# Patient Record
Sex: FEMALE | Race: WHITE
[De-identification: names, ages, dates, MRNs, and addresses within clinical notes are randomized per-mention and may not be internally consistent; named-entity substitution may affect disease eponyms.]

---

## 2022-08-10 ENCOUNTER — HOSPITAL ENCOUNTER (INPATIENT)
Dept: HOSPITAL 26 - MED | Age: 82
LOS: 7 days | Discharge: NURSING, REHAB OR LONG-TERM CARE | DRG: 637 | End: 2022-08-17
Attending: STUDENT IN AN ORGANIZED HEALTH CARE EDUCATION/TRAINING PROGRAM | Admitting: STUDENT IN AN ORGANIZED HEALTH CARE EDUCATION/TRAINING PROGRAM
Payer: COMMERCIAL

## 2022-08-10 VITALS — SYSTOLIC BLOOD PRESSURE: 120 MMHG | DIASTOLIC BLOOD PRESSURE: 68 MMHG

## 2022-08-10 VITALS — WEIGHT: 180 LBS | BODY MASS INDEX: 28.25 KG/M2 | HEIGHT: 67 IN

## 2022-08-10 VITALS — SYSTOLIC BLOOD PRESSURE: 169 MMHG | DIASTOLIC BLOOD PRESSURE: 69 MMHG

## 2022-08-10 DIAGNOSIS — Z88.1: ICD-10-CM

## 2022-08-10 DIAGNOSIS — E11.01: Primary | ICD-10-CM

## 2022-08-10 DIAGNOSIS — E78.5: ICD-10-CM

## 2022-08-10 DIAGNOSIS — E87.6: ICD-10-CM

## 2022-08-10 DIAGNOSIS — I10: ICD-10-CM

## 2022-08-10 DIAGNOSIS — E83.42: ICD-10-CM

## 2022-08-10 DIAGNOSIS — M19.90: ICD-10-CM

## 2022-08-10 DIAGNOSIS — G93.41: ICD-10-CM

## 2022-08-10 DIAGNOSIS — N39.0: ICD-10-CM

## 2022-08-10 DIAGNOSIS — Z79.899: ICD-10-CM

## 2022-08-10 DIAGNOSIS — Z87.442: ICD-10-CM

## 2022-08-10 DIAGNOSIS — L89.159: ICD-10-CM

## 2022-08-10 DIAGNOSIS — Z20.822: ICD-10-CM

## 2022-08-10 DIAGNOSIS — Z88.8: ICD-10-CM

## 2022-08-10 DIAGNOSIS — Z88.5: ICD-10-CM

## 2022-08-10 LAB
ALBUMIN FLD-MCNC: 3.2 G/DL (ref 3.4–5)
ANION GAP SERPL CALCULATED.3IONS-SCNC: 15.6 MMOL/L (ref 8–16)
APPEARANCE UR: (no result)
AST SERPL-CCNC: 10 U/L (ref 15–37)
BASOPHILS # BLD AUTO: 0.1 K/UL (ref 0–0.22)
BASOPHILS NFR BLD AUTO: 1.1 % (ref 0–2)
BILIRUB SERPL-MCNC: 0.3 MG/DL (ref 0–1)
BILIRUB UR QL STRIP: (no result)
BUN SERPL-MCNC: 18 MG/DL (ref 7–18)
CHLORIDE SERPL-SCNC: 100 MMOL/L (ref 98–107)
CO2 SERPL-SCNC: 25.3 MMOL/L (ref 21–32)
COLOR UR: YELLOW
CREAT SERPL-MCNC: 1.1 MG/DL (ref 0.6–1.3)
EOSINOPHIL # BLD AUTO: 0 K/UL (ref 0–0.4)
EOSINOPHIL NFR BLD AUTO: 0.6 % (ref 0–4)
ERYTHROCYTE [DISTWIDTH] IN BLOOD BY AUTOMATED COUNT: 14.5 % (ref 11.6–13.7)
GFR SERPL CREATININE-BSD FRML MDRD: (no result) ML/MIN (ref 90–?)
GLUCOSE SERPL-MCNC: 450 MG/DL (ref 74–106)
GLUCOSE UR STRIP-MCNC: (no result) MG/DL
HCT VFR BLD AUTO: 38.6 % (ref 36–48)
HGB BLD-MCNC: 12.7 G/DL (ref 12–16)
HGB UR QL STRIP: (no result)
LEUKOCYTE ESTERASE UR QL STRIP: (no result)
LYMPHOCYTES # BLD AUTO: 1.3 K/UL (ref 2.5–16.5)
LYMPHOCYTES NFR BLD AUTO: 18.1 % (ref 20.5–51.1)
MCH RBC QN AUTO: 28 PG (ref 27–31)
MCHC RBC AUTO-ENTMCNC: 33 G/DL (ref 33–37)
MCV RBC AUTO: 84.4 FL (ref 80–94)
MONOCYTES # BLD AUTO: 0.4 K/UL (ref 0.8–1)
MONOCYTES NFR BLD AUTO: 5.9 % (ref 1.7–9.3)
NEUTROPHILS # BLD AUTO: 5.2 K/UL (ref 1.8–7.7)
NEUTROPHILS NFR BLD AUTO: 74.3 % (ref 42.2–75.2)
NITRITE UR QL STRIP: NEGATIVE
PH UR STRIP: 5.5 [PH] (ref 5–9)
PLATELET # BLD AUTO: 325 K/UL (ref 140–450)
POTASSIUM SERPL-SCNC: 3.9 MMOL/L (ref 3.5–5.1)
RBC # BLD AUTO: 4.58 MIL/UL (ref 4.2–5.4)
RBC #/AREA URNS HPF: (no result) /HPF (ref 0–5)
SODIUM SERPL-SCNC: 137 MMOL/L (ref 136–145)
URATE CRY URNS QL MICRO: (no result) /HPF
WBC # BLD AUTO: 7 K/UL (ref 4.8–10.8)
WBC,URINE: (no result) /HPF (ref 0–5)

## 2022-08-10 PROCEDURE — A6248 HYDROGEL DRSG GEL FILLER: HCPCS

## 2022-08-10 RX ADMIN — Medication SCH DEV: at 21:00

## 2022-08-10 RX ADMIN — INSULIN LISPRO PRN UNITS: 100 INJECTION, SOLUTION INTRAVENOUS; SUBCUTANEOUS at 22:07

## 2022-08-10 RX ADMIN — SODIUM CHLORIDE SCH MLS/HR: 9 INJECTION, SOLUTION INTRAVENOUS at 20:11

## 2022-08-10 NOTE — NUR
Patient will be admitted to care of DR. KENDALL. Admited to TELEMTETRY.  Will 
go to room 105A. Belongings list completed.  Report to YADI.

## 2022-08-10 NOTE — NUR
81YR OLD FEMALE BIB EMS C/O ALOC /WEAKNESS. PT IS FROM [facility].  EMS 
SENT OUT FOR PT BEING MORE ALOC THAN NORMAL.  PT IS A&OX1.  BED BOUND .  SKIN 
INTACT WARM AND DRY.  HOB ELEVATED AND SIDE RAILS UP .  BED AT LOWEST LEVEL.

## 2022-08-10 NOTE — NUR
STARTED IV FLUIDS AT 75 ML/HR WITH PUMP. LIGHTS DIMMED PER PT REQUEST. ASKED PT 
QUESTIONS AND SHE COULD NOT RECLALL AND SAID "WHY ARE YOU ASKING ME ALL THESE 
QUESTIONS" . PT A&O X0

## 2022-08-10 NOTE — NUR
RECEIVED REPORT FROM ER NURSE. PATIENT IS AWAKE AND CONFUSED. RESPIRATION EVEN UNLABORED ON 
ROOM AIR. NO DISTRESS NOTED. SKIN IS WARM AND DRY. IV PATENT AND INTACT. LUNGS SOUNDS CLEAR 
UPON AUSCULTATION. BOWEL SOUNDS PRESENT IN ALL QUADRANTS. ABDOMEN SOFT AND NON-TENDER. 
SACRAL WOUND NOTED. PICTURE TAKEN.  GENERALIZED WEAKNESS NOTED. MRSA SWAB. VITALS WERE 
TAKEN. ORIENT PATIENT TO ROOM, STAFF AND CALL LIGHT. ALL SAFETY MEASURES IN PLACE. BED IS AT 
LOW POSITION. CALL LIGHT WITHIN REACH. WILL CONTINUE TO MONITOR.

## 2022-08-11 VITALS — SYSTOLIC BLOOD PRESSURE: 153 MMHG | DIASTOLIC BLOOD PRESSURE: 77 MMHG

## 2022-08-11 VITALS — SYSTOLIC BLOOD PRESSURE: 178 MMHG | DIASTOLIC BLOOD PRESSURE: 71 MMHG

## 2022-08-11 VITALS — DIASTOLIC BLOOD PRESSURE: 74 MMHG | SYSTOLIC BLOOD PRESSURE: 150 MMHG

## 2022-08-11 VITALS — SYSTOLIC BLOOD PRESSURE: 157 MMHG | DIASTOLIC BLOOD PRESSURE: 77 MMHG

## 2022-08-11 VITALS — DIASTOLIC BLOOD PRESSURE: 85 MMHG | SYSTOLIC BLOOD PRESSURE: 143 MMHG

## 2022-08-11 VITALS — SYSTOLIC BLOOD PRESSURE: 151 MMHG | DIASTOLIC BLOOD PRESSURE: 62 MMHG

## 2022-08-11 LAB
ANION GAP SERPL CALCULATED.3IONS-SCNC: 14.3 MMOL/L (ref 8–16)
BASOPHILS # BLD AUTO: 0.1 K/UL (ref 0–0.22)
BASOPHILS NFR BLD AUTO: 0.9 % (ref 0–2)
BUN SERPL-MCNC: 19 MG/DL (ref 7–18)
CHLORIDE SERPL-SCNC: 106 MMOL/L (ref 98–107)
CO2 SERPL-SCNC: 26 MMOL/L (ref 21–32)
CREAT SERPL-MCNC: 1.1 MG/DL (ref 0.6–1.3)
EOSINOPHIL # BLD AUTO: 0.1 K/UL (ref 0–0.4)
EOSINOPHIL NFR BLD AUTO: 1.3 % (ref 0–4)
ERYTHROCYTE [DISTWIDTH] IN BLOOD BY AUTOMATED COUNT: 14.6 % (ref 11.6–13.7)
GFR SERPL CREATININE-BSD FRML MDRD: (no result) ML/MIN (ref 90–?)
GLUCOSE SERPL-MCNC: 261 MG/DL (ref 74–106)
HCT VFR BLD AUTO: 34 % (ref 36–48)
HGB BLD-MCNC: 11.3 G/DL (ref 12–16)
LYMPHOCYTES # BLD AUTO: 1.5 K/UL (ref 2.5–16.5)
LYMPHOCYTES NFR BLD AUTO: 24.2 % (ref 20.5–51.1)
MCH RBC QN AUTO: 28 PG (ref 27–31)
MCHC RBC AUTO-ENTMCNC: 33 G/DL (ref 33–37)
MCV RBC AUTO: 84.3 FL (ref 80–94)
MONOCYTES # BLD AUTO: 0.5 K/UL (ref 0.8–1)
MONOCYTES NFR BLD AUTO: 8.4 % (ref 1.7–9.3)
NEUTROPHILS # BLD AUTO: 4.2 K/UL (ref 1.8–7.7)
NEUTROPHILS NFR BLD AUTO: 65.2 % (ref 42.2–75.2)
PLATELET # BLD AUTO: 270 K/UL (ref 140–450)
POTASSIUM SERPL-SCNC: 3.3 MMOL/L (ref 3.5–5.1)
RBC # BLD AUTO: 4.03 MIL/UL (ref 4.2–5.4)
SODIUM SERPL-SCNC: 143 MMOL/L (ref 136–145)
WBC # BLD AUTO: 6.4 K/UL (ref 4.8–10.8)

## 2022-08-11 RX ADMIN — INSULIN LISPRO PRN UNITS: 100 INJECTION, SOLUTION INTRAVENOUS; SUBCUTANEOUS at 06:31

## 2022-08-11 RX ADMIN — Medication SCH DEV: at 16:49

## 2022-08-11 RX ADMIN — Medication SCH EA: at 12:23

## 2022-08-11 RX ADMIN — SODIUM CHLORIDE SCH MLS/HR: 9 INJECTION, SOLUTION INTRAVENOUS at 07:54

## 2022-08-11 RX ADMIN — INSULIN LISPRO PRN UNITS: 100 INJECTION, SOLUTION INTRAVENOUS; SUBCUTANEOUS at 12:24

## 2022-08-11 RX ADMIN — Medication SCH DEV: at 12:23

## 2022-08-11 RX ADMIN — INSULIN LISPRO PRN UNITS: 100 INJECTION, SOLUTION INTRAVENOUS; SUBCUTANEOUS at 20:46

## 2022-08-11 RX ADMIN — POTASSIUM CHLORIDE PRN MEQ: 750 TABLET, FILM COATED, EXTENDED RELEASE ORAL at 08:59

## 2022-08-11 RX ADMIN — Medication SCH DEV: at 06:30

## 2022-08-11 RX ADMIN — Medication SCH DEV: at 20:43

## 2022-08-11 RX ADMIN — INSULIN LISPRO PRN UNITS: 100 INJECTION, SOLUTION INTRAVENOUS; SUBCUTANEOUS at 16:55

## 2022-08-11 RX ADMIN — SODIUM CHLORIDE SCH MLS/HR: 9 INJECTION, SOLUTION INTRAVENOUS at 00:30

## 2022-08-11 RX ADMIN — MAGNESIUM SULFATE IN WATER PRN MLS/HR: 40 INJECTION, SOLUTION INTRAVENOUS at 09:32

## 2022-08-11 RX ADMIN — LEVOFLOXACIN SCH MLS/HR: 5 INJECTION, SOLUTION INTRAVENOUS at 20:23

## 2022-08-11 NOTE — NUR
RECEIVED REPORT FROM NIGHT SHIFT NURSE FOR CONTINUITY OF CARE. PT IS AWAKE. A&O1. 
RESPIRATIONS EVEN AND UNLABORED ON ROOM AIR. NO DISTRESS NOTED. PT ON TELE MONITOR. IV SITE 
AT RIGHT WRIST 20G INFUSING NS AT 75 ML/ HR. CALL LIGHT WITHIN REACH. SAFETY PRECAUTIONS IN 
PLACE. WILL CONTINUE TO MONITOR.

## 2022-08-11 NOTE — NUR
BLOOD GLUCOSE CHECK DONE. . SLIDING SCALE INSULIN ADMINISTERED.

-------------------------------------------------------------------------------

Addendum: 08/11/22 at 1714 by Jose Daniel Ulloa LVN

-------------------------------------------------------------------------------

ERROR

## 2022-08-11 NOTE — NUR
PATIENT HAS BEEN SCREENED AND CATEGORIZED AS MODERATE NUTRITION RISK. PATIENT WILL BE SEEN 
WITHIN 3-5 DAYS OF ADMISSION.



8/13/228/15/22



BIJAL MCALLISTER RD


-------------------------------------------------------------------------------

Addendum: 08/12/22 at 1133 by Bijal Mcallister RD

-------------------------------------------------------------------------------

FNS CONSULT HAS BEEN RECEIVED FOR WOUNDS. PATIENT HAS BEEN RE-SCREENED AS HIGH RISK AND WILL 
BE SEEN WITHIN 1-2 DAYS OF RECEIVING THE FNS CONSULT.



BIJAL MCALLISTER RD

## 2022-08-11 NOTE — NUR
ADMINISTERED SCHEDULED MORNING MEDS. KDUR GIVEN FOR POTASSIUM 3.3. IV MG SULF GIVEN BY TANGELA CARO. PT TEACHING ABOUT MEDS GIVEN. PT VERBALIZED UNDERSTANDING. WILL CONTINUE TO MONITOR.

## 2022-08-11 NOTE — NUR
DID ROUNDS. PT SITTING UPRIGHT IN BED, EATING DINNER. NO DISTRESS NOTED. CALL LIGHT WITHIN 
REACH. SAFETY PRECAUTIONS IN PLACE. WILL CONTINUE TO MONITOR.

## 2022-08-11 NOTE — NUR
ADMINISTERED SCHEDULED MORNING MEDS. PT TEACHING ABOUT MEDS GIVEN. PT VERBALIZED 
UNDERSTANDING. POTASSIUM IS 3.3. WILL INFORM RN. 

-------------------------------------------------------------------------------

Addendum: 08/11/22 at 0930 by Jose Daniel Ulloa LVN

-------------------------------------------------------------------------------

WRONG PT DOCUMENTED.

## 2022-08-11 NOTE — NUR
BLOOD GLUCOSE 237. PT REFUSED TO TAKE SLIDING SCALE INSULIN. ATTEMPTED 2X. ASKED HELP FROM 
TANGELA CARO. DISCUSSED WITH THE PT THE IMPORTANCE AND RISK OF NOT TAKING THE INSULIN. PT 
VERBALIZED UNDERSTANDING AND AGAIN REFUSED. WILL CONTINUE TO MONITOR.

## 2022-08-11 NOTE — NUR
DID ROUNDS. PT COMFORTABLY SITTING IN BED, WATCHING TV. NO DISTRESS NOTED. NO COMPLAINTS OF 
PAIN. CALL LIGHT WITHIN REACH. SAFETY PRECAUTIONS IN PLACE. WILL CONTINUE TO MONITOR.

## 2022-08-11 NOTE — NUR
DC PLANNIN YRS OLD FEMALE PATIENT WAS ADMITTED FROM [facility] WITH A DX OF ANTHONY. PATIENT HAS A 
HX OF DM, HTN, HLD, OSTEOARTHRITIS AND KIDNEY STONE AND UTI. PATIENT WAS RECENTLY DC TO 
[facility] AND SENT BACK TO [facility] ON . CXR SHOWED NO ACUTE 
CARDIOPULMONARY DISEASE. RAPID COVID TEST NEGATIVE. HEAD CT NO ACUTE INTRACRANIAL PROCESS. 
URINE CULTURE PENDING. ADMINISTERED IVF, IV ABX LEVAQUIN. PT EVAL PENDING. DC PLAN PER MD'S 
RECOMMENDATIONS. CM TO FOLLOW 

-------------------------------------------------------------------------------

Addendum: 22 at 1052 by Deirdre Fine RN

-------------------------------------------------------------------------------

DC PLANNING:

CALLED [facility] SPOKE WITH SUDHEER STATED THEY CAN NOT PROVIDE THE CARE SHE NEEDS ANY 
MORE , HER [facility]  IS LOOKING FOR PLACEMENT. CALLED [facility] SPOKE WITH 
MILENA LARES STATED  [facility] IS WORKING ON PLACEMENT AND WILL CONTACT US. CM TO 
FOLLOW 

-------------------------------------------------------------------------------

Addendum: 22 at 1057 by Deirdre Fine RN

-------------------------------------------------------------------------------

DC PLANNING:

PATIENT GOT ACCEPTED AT [facility] .RECEIVED AUTHORIZATION FROM MILENA AT [facility] FOR [facility] AND DL 
TRANSPORT [id number]. PATIENT CAN GO TO ROOM [facility]  # TO GIVE REPORT . DL TRANSPORT 
WILL  PATIENT AT 2 PM. NOTIFIED MING GARCIA CM TO FOLLOW

## 2022-08-12 VITALS — DIASTOLIC BLOOD PRESSURE: 86 MMHG | SYSTOLIC BLOOD PRESSURE: 197 MMHG

## 2022-08-12 VITALS — SYSTOLIC BLOOD PRESSURE: 166 MMHG | DIASTOLIC BLOOD PRESSURE: 73 MMHG

## 2022-08-12 VITALS — SYSTOLIC BLOOD PRESSURE: 179 MMHG | DIASTOLIC BLOOD PRESSURE: 91 MMHG

## 2022-08-12 VITALS — DIASTOLIC BLOOD PRESSURE: 74 MMHG | SYSTOLIC BLOOD PRESSURE: 156 MMHG

## 2022-08-12 VITALS — SYSTOLIC BLOOD PRESSURE: 128 MMHG | DIASTOLIC BLOOD PRESSURE: 75 MMHG

## 2022-08-12 VITALS — SYSTOLIC BLOOD PRESSURE: 181 MMHG | DIASTOLIC BLOOD PRESSURE: 78 MMHG

## 2022-08-12 LAB
ANION GAP SERPL CALCULATED.3IONS-SCNC: 12.1 MMOL/L (ref 8–16)
BASOPHILS # BLD AUTO: 0.1 K/UL (ref 0–0.22)
BASOPHILS NFR BLD AUTO: 1 % (ref 0–2)
BUN SERPL-MCNC: 14 MG/DL (ref 7–18)
CHLORIDE SERPL-SCNC: 104 MMOL/L (ref 98–107)
CO2 SERPL-SCNC: 25.6 MMOL/L (ref 21–32)
CREAT SERPL-MCNC: 1.1 MG/DL (ref 0.6–1.3)
EOSINOPHIL # BLD AUTO: 0.1 K/UL (ref 0–0.4)
EOSINOPHIL NFR BLD AUTO: 2 % (ref 0–4)
ERYTHROCYTE [DISTWIDTH] IN BLOOD BY AUTOMATED COUNT: 14.4 % (ref 11.6–13.7)
GFR SERPL CREATININE-BSD FRML MDRD: (no result) ML/MIN (ref 90–?)
GLUCOSE SERPL-MCNC: 255 MG/DL (ref 74–106)
HCT VFR BLD AUTO: 32.1 % (ref 36–48)
HGB BLD-MCNC: 10.8 G/DL (ref 12–16)
LYMPHOCYTES # BLD AUTO: 2.1 K/UL (ref 2.5–16.5)
LYMPHOCYTES NFR BLD AUTO: 34.2 % (ref 20.5–51.1)
MCH RBC QN AUTO: 29 PG (ref 27–31)
MCHC RBC AUTO-ENTMCNC: 34 G/DL (ref 33–37)
MCV RBC AUTO: 84.3 FL (ref 80–94)
MONOCYTES # BLD AUTO: 0.5 K/UL (ref 0.8–1)
MONOCYTES NFR BLD AUTO: 8.6 % (ref 1.7–9.3)
NEUTROPHILS # BLD AUTO: 3.3 K/UL (ref 1.8–7.7)
NEUTROPHILS NFR BLD AUTO: 54.2 % (ref 42.2–75.2)
PLATELET # BLD AUTO: 251 K/UL (ref 140–450)
POTASSIUM SERPL-SCNC: 3.7 MMOL/L (ref 3.5–5.1)
RBC # BLD AUTO: 3.81 MIL/UL (ref 4.2–5.4)
SODIUM SERPL-SCNC: 138 MMOL/L (ref 136–145)
WBC # BLD AUTO: 6 K/UL (ref 4.8–10.8)

## 2022-08-12 RX ADMIN — Medication SCH DEV: at 22:00

## 2022-08-12 RX ADMIN — CHLORHEXIDINE GLUCONATE SCH EA: 2 SOLUTION TOPICAL at 15:00

## 2022-08-12 RX ADMIN — WHITE PETROLATUM SCH EA: 57 PASTE TOPICAL at 12:10

## 2022-08-12 RX ADMIN — INSULIN LISPRO PRN UNITS: 100 INJECTION, SOLUTION INTRAVENOUS; SUBCUTANEOUS at 22:07

## 2022-08-12 RX ADMIN — Medication SCH DEV: at 17:10

## 2022-08-12 RX ADMIN — MAGNESIUM SULFATE IN WATER PRN MLS/HR: 40 INJECTION, SOLUTION INTRAVENOUS at 09:45

## 2022-08-12 RX ADMIN — INSULIN LISPRO PRN UNITS: 100 INJECTION, SOLUTION INTRAVENOUS; SUBCUTANEOUS at 11:36

## 2022-08-12 RX ADMIN — Medication SCH EA: at 09:04

## 2022-08-12 RX ADMIN — INSULIN LISPRO PRN UNITS: 100 INJECTION, SOLUTION INTRAVENOUS; SUBCUTANEOUS at 17:11

## 2022-08-12 RX ADMIN — MUPIROCIN SCH GM: 2 CREAM TOPICAL at 15:00

## 2022-08-12 RX ADMIN — Medication SCH DEV: at 06:06

## 2022-08-12 RX ADMIN — INSULIN LISPRO PRN UNITS: 100 INJECTION, SOLUTION INTRAVENOUS; SUBCUTANEOUS at 06:07

## 2022-08-12 RX ADMIN — LEVOFLOXACIN SCH MLS/HR: 5 INJECTION, SOLUTION INTRAVENOUS at 21:51

## 2022-08-12 RX ADMIN — Medication SCH DEV: at 11:33

## 2022-08-12 NOTE — NUR
RECEIVED REPORT FROM NIGHT SHIFT NURSE FOR CONTINUITY OF CARE. PT IS SLEEPING, EASILY 
AROUSABLE BY VERBAL STIMULI. RESPIRATIONS EVEN AND UNLABORED ON RA. A&O1 TO NAME. PT ON TELE 
MONITOR. PT INCONTINENT TO BLADDER AND BOWEL. IV SITE AT RIGHT WRIST 20G INFUSING NS AT 
75ML/HR. CALL LIGHT WITHIN REACH. SAFETY PRECAUTIONS IN PLACE. WILL CONTINUE TO MONITOR.

## 2022-08-12 NOTE — NUR
BLOOD GLUCOSE 235. ADMINISTERED SLIDING SCALE INSULIN. SCHEDULED BP MED GIVEN. PT TEACHING 
ABOUT MEDS DONE. PT VERBALIZED UNDERSTANDING. WILL CONTINUE TO MONITOR.

## 2022-08-12 NOTE — NUR
***** PHYSICAL THERAPY CO-SIGN *****

The Physical Therapy Progress Notes documented by Physical Therapy Assistant have been 
reviewed.



Reviewed/Co-Signed by: Judith Alva

Documentation Done by: PAUL OSHEA PTA

-------------------------------------------------------------------------------

Addendum: 08/12/22 at 1209 by Judith Alva PT

-------------------------------------------------------------------------------

Amended: Links added.

## 2022-08-12 NOTE — NUR
WOUND CARE NOTE:

SKIN ASSESSMENT DONE WITH THIS 82 Y/O PT, ADMITTED WITH INITIAL DX AMS.  PAST MEDICAL HX 
INCLUDES DIABETES, HTN, HYPERLIPIDEMIA, OSTEOARTHRITIS, AND HX OF KIDNEY STONES AND ADMITTED 
RECENTLY FOR UTI. ALL ABOVE INFORMATION OBTAINED FROM ADMISSION H&P.PT ADMITTED WITH 
PRESSURE INJURY TO SACRAL AREA, PT IS AWAKE, CONFUSE, SKIN IS WARM AND DRY, BLE HAIR GROWTH, 
NO EDEMA.  DORSAL PEDAL PULSES PRESENT AND NORMAL. CAPILLARY REFILLED < 2 SEC. X 10 TOES. 
INCONTINENT OF BOWEL AND BLADDER. PLAN OF CARE DISCUSSED WITH PRIMARY RN. 



-MOISTURE ASSOCIATED DERMATITIS TO PERINEUM AND LOWER BUTTOCKS, SKIN RED, MOIST, INTACT

-PRESSURE INJURY STAGE 2  SACRAL AREA 1X1CM SUPERFICIAL DEPTH, WOUND % RED TISSUE, NO 
ODOR, ISIAH-WOUND SKIN MOIST AND INTACT, NO ODOR, PAIN 0/10



RECOMMENDATIONS:

-CLEANSE SACRAL WOUND WITH NS, PAT DRY, APPLY HYDROGEL AND COVER WITH ISLAND DRESSING QD AND 
PRN IF SOILING

-APPLY Z GUARD TO PERINEUM AND LOWER BUTTOCKS BID AND PRN IF SOILING

-POSITIONING:

TURN AND REPOSITION PATIENT Q 2H OR SOONER

USE PILLOWS TO KEEP BONY PROMINENCES FROM DIRECT CONTACT WITH SURFACES

USE REPOSITIONING WEDGES TO PROVIDE 30-DEGREE ANGLE FOR SIDE LYING POSITIONS

OFFLOADING OR FOAM DRESSING TO ALL TUBING TO PREVENT MEDICAL DEVICES RELATED PRESSURE INJURY

-RE-EVALUATING AND MANAGING INCONTINENCE

MONITOR SKIN CONDITION DURING POSITION CHANGE

DO NOT MASSAGE REDNESS, BONY PROMINENCES

FREQUENT ISIAH-CARE AND PROVIDE BARRIER CREAMS PRN IF SOILING

MOISTURE CONTROL BY OFFER BED PAN/URINAL /ABSORBENT PAD TO WICK AND HOLD MOISTURE

KEEP SKIN DRY AND PROTECT FROM FRICTION

-MANAGE FRICTION/SHEAR/MOBILITY

KEEP HOB AT THE LOWEST LEVEL OF ELEVATION NO MORE THAN 30 DEGREE UNLESS OTHERWISE 
CONTRAINDICATED

USE LIFT SHEET OR TRANSFER DEVICE TO MOVE PATIENT AND PREVENT LATERAL SHEER.

PROTECT HEELS, ELBOWS BONY PROMINENCES WITH SKIN BERRIES OR FOAM DRESSING IF EXPOSED TO 
FRICTION

OFFLOAD BILATERAL HEELS BY PLACING PILLOWS UNDER CALVES AT ALL TIMES, UNLESS OTHERWISE 
CONTRAINDICATED

-PRESSURE REDISTRIBUTION SURFACE THERAPY DIANA ISOFLEX MATTRESS

-NUTRITION:

PLEASE FOLLOW RD RECOMMENDATIONS AND OFFER NUTRITION SUPPLEMENTS IF ORDERED

## 2022-08-12 NOTE — NUR
CLEANED AND CHANGED PT BEDDINGS. APPLIED TP Z-GUARD AND HYDROGEL AS ORDERED. PT REMAINED 
CLEAN AND DRY.

## 2022-08-12 NOTE — NUR
RECEIVED REPORT FROM AM NURSE FOR CONTINUITY OF CARE. PT IS STABLE. AWAKE SITTING UP IN BED 
EATING DINNER. CONSUMED ONLY 25%. A&OX1 NAME, CONFUSED FORGETFUL. FOLLOWS COMMANDS 
SOMETIMES. DENIES PAIN AT THIS TIME. ON RM AIR/O2 WITH NO ACUTE DISTRESS.RR EVEN AND 
UNLABORED WITH EQUAL CHEST RISE. GI INTACT. PT'S SKIN IS NOT INTACT IV R WRIST 20G S.L. IS 
FLUSHED AND PATENT. PT IS BEDBOUND AND INCONTINENT. ALL SAFETY MEADSURES IN PLACE.CALL LIGHT 
WITHIN REACH. WILL CONTINUE TO MONITOR.

## 2022-08-12 NOTE — NUR
8/12/22 RD INITIAL ASSESSMENT COMPLETED



PLEASE REFER TO NUTRITION ASSESSMENT UNDER CARE ACTIVITY FOR ESTIMATED NUTRITIONAL NEEDS. 



1. CONTINUE CCHO 60GM DIET AS TOLERATED 

2. RECOMMEND GLUCERNA 1XDAY AND PROSOURCE BID PER RX PROTOCOL

3. PROVIDED DIABETES NUTRITION EDUCATION HANDOUTS 

4. MONITOR PO INTAKE 

5. RD TO FOLLOW-UP 3-5 DAYS, MODERATE RISK 



JEANIE MCALLISTER RD

## 2022-08-12 NOTE — NUR
IV MG SULF ADMINISTERED BY TANGELA ZAVALA FOR  MG 1.2. NO ADVERSE REACTION NOTED. WILL CONTINUE 
TO MONITOR.

## 2022-08-12 NOTE — NUR
CLEANED AND CHANGED PT BEDDINGS. APPLIED TP Z-GUARD AND HYDRAGUARD AS ORDERED.  

-------------------------------------------------------------------------------

Addendum: 08/12/22 at 1219 by Jose Daniel Ulloa LVN

-------------------------------------------------------------------------------

WITH CORRECTION

## 2022-08-12 NOTE — NUR
HS MEDS GIVEN. BS= 241 COVERED WITH SCHEDULED 10 UNITS LANTUS INSULIN PLUS 4 UNITS HUMALOG 
INSULIN PER SLIDING SCALE. WILL CONTINUE WITH FREQ ROUNDS.

## 2022-08-12 NOTE — NUR
BP= 179/91 RECEIVED PRN APRESOLINE 10 MG IVP PER PROTOCOL. REASSESSED AT 2305 MED EFFECTIVE 
BP /65. WILL CONTINUE TO MONITOR PATIENT.

## 2022-08-13 VITALS — SYSTOLIC BLOOD PRESSURE: 142 MMHG | DIASTOLIC BLOOD PRESSURE: 83 MMHG

## 2022-08-13 VITALS — SYSTOLIC BLOOD PRESSURE: 159 MMHG | DIASTOLIC BLOOD PRESSURE: 66 MMHG

## 2022-08-13 VITALS — SYSTOLIC BLOOD PRESSURE: 146 MMHG | DIASTOLIC BLOOD PRESSURE: 69 MMHG

## 2022-08-13 VITALS — SYSTOLIC BLOOD PRESSURE: 146 MMHG | DIASTOLIC BLOOD PRESSURE: 68 MMHG

## 2022-08-13 VITALS — SYSTOLIC BLOOD PRESSURE: 158 MMHG | DIASTOLIC BLOOD PRESSURE: 65 MMHG

## 2022-08-13 VITALS — SYSTOLIC BLOOD PRESSURE: 153 MMHG | DIASTOLIC BLOOD PRESSURE: 82 MMHG

## 2022-08-13 LAB
ANION GAP SERPL CALCULATED.3IONS-SCNC: 14.3 MMOL/L (ref 8–16)
BARBITURATES UR QL SCN: NEGATIVE NG/ML
BASOPHILS # BLD AUTO: 0.1 K/UL (ref 0–0.22)
BASOPHILS NFR BLD AUTO: 0.9 % (ref 0–2)
BENZODIAZ UR QL SCN: NEGATIVE NG/ML
BUN SERPL-MCNC: 9 MG/DL (ref 7–18)
BZE UR QL SCN: NEGATIVE NG/ML
CANNABINOIDS UR QL SCN: NEGATIVE NG/ML
CHLORIDE SERPL-SCNC: 105 MMOL/L (ref 98–107)
CO2 SERPL-SCNC: 23 MMOL/L (ref 21–32)
CREAT SERPL-MCNC: 0.9 MG/DL (ref 0.6–1.3)
EOSINOPHIL # BLD AUTO: 0.1 K/UL (ref 0–0.4)
EOSINOPHIL NFR BLD AUTO: 1.5 % (ref 0–4)
ERYTHROCYTE [DISTWIDTH] IN BLOOD BY AUTOMATED COUNT: 14.7 % (ref 11.6–13.7)
GFR SERPL CREATININE-BSD FRML MDRD: (no result) ML/MIN (ref 90–?)
GLUCOSE SERPL-MCNC: 219 MG/DL (ref 74–106)
HCT VFR BLD AUTO: 34.4 % (ref 36–48)
HGB BLD-MCNC: 11.5 G/DL (ref 12–16)
LYMPHOCYTES # BLD AUTO: 1.8 K/UL (ref 2.5–16.5)
LYMPHOCYTES NFR BLD AUTO: 27.7 % (ref 20.5–51.1)
MCH RBC QN AUTO: 28 PG (ref 27–31)
MCHC RBC AUTO-ENTMCNC: 33 G/DL (ref 33–37)
MCV RBC AUTO: 84.6 FL (ref 80–94)
MONOCYTES # BLD AUTO: 0.6 K/UL (ref 0.8–1)
MONOCYTES NFR BLD AUTO: 8.5 % (ref 1.7–9.3)
NEUTROPHILS # BLD AUTO: 4 K/UL (ref 1.8–7.7)
NEUTROPHILS NFR BLD AUTO: 61.4 % (ref 42.2–75.2)
OPIATES UR QL SCN: NEGATIVE NG/ML
PCP UR QL SCN: NEGATIVE NG/ML
PLATELET # BLD AUTO: 276 K/UL (ref 140–450)
POTASSIUM SERPL-SCNC: 3.3 MMOL/L (ref 3.5–5.1)
RBC # BLD AUTO: 4.06 MIL/UL (ref 4.2–5.4)
SODIUM SERPL-SCNC: 139 MMOL/L (ref 136–145)
WBC # BLD AUTO: 6.4 K/UL (ref 4.8–10.8)

## 2022-08-13 RX ADMIN — CHLORHEXIDINE GLUCONATE SCH EA: 2 SOLUTION TOPICAL at 15:08

## 2022-08-13 RX ADMIN — INSULIN LISPRO PRN UNITS: 100 INJECTION, SOLUTION INTRAVENOUS; SUBCUTANEOUS at 06:29

## 2022-08-13 RX ADMIN — Medication SCH DEV: at 12:22

## 2022-08-13 RX ADMIN — INSULIN LISPRO PRN UNITS: 100 INJECTION, SOLUTION INTRAVENOUS; SUBCUTANEOUS at 23:06

## 2022-08-13 RX ADMIN — INSULIN LISPRO PRN UNITS: 100 INJECTION, SOLUTION INTRAVENOUS; SUBCUTANEOUS at 12:24

## 2022-08-13 RX ADMIN — LEVOFLOXACIN SCH MLS/HR: 5 INJECTION, SOLUTION INTRAVENOUS at 22:50

## 2022-08-13 RX ADMIN — WHITE PETROLATUM SCH EA: 57 PASTE TOPICAL at 13:39

## 2022-08-13 RX ADMIN — Medication SCH GEL: at 13:39

## 2022-08-13 RX ADMIN — POTASSIUM CHLORIDE PRN MEQ: 750 TABLET, FILM COATED, EXTENDED RELEASE ORAL at 08:55

## 2022-08-13 RX ADMIN — Medication SCH DEV: at 06:29

## 2022-08-13 RX ADMIN — Medication SCH DEV: at 22:50

## 2022-08-13 RX ADMIN — Medication SCH DEV: at 16:30

## 2022-08-13 RX ADMIN — INSULIN LISPRO PRN UNITS: 100 INJECTION, SOLUTION INTRAVENOUS; SUBCUTANEOUS at 17:38

## 2022-08-13 RX ADMIN — Medication SCH EA: at 09:10

## 2022-08-13 RX ADMIN — MUPIROCIN SCH GM: 2 CREAM TOPICAL at 15:08

## 2022-08-13 RX ADMIN — INSULIN GLARGINE SCH UNITS: 100 INJECTION, SOLUTION SUBCUTANEOUS at 08:59

## 2022-08-13 RX ADMIN — WHITE PETROLATUM SCH EA: 57 PASTE TOPICAL at 01:11

## 2022-08-13 NOTE — NUR
ENDORSED PT REPORT TO AM NURSE FOR CONTINUITY OF CARE. PT IS STABLE. ALL NEEDS MET 
THROUGHOUT THE SHIFT.

## 2022-08-13 NOTE — NUR
DID ROUNDS. PT IN BED, WATCHING TV. NO DISTRESS NOTED. DENIES PAIN. CALL LIGHT WITHIN REACH. 
SAFETY PRECAUTIONS IN PLACE. WILL CONTINUE TO MONITOR.

## 2022-08-13 NOTE — NUR
TURNED AND REPOSITIONED Q 2HRS. INCONTINENT OF BOWEL X1 AND URINE X2. ISIAH CARE GIVEN . 
CHANGED STAGE  SACRAL / R BUTTOCK WD. APPLIED HYDROGEL AND FOAM DRESSING PER ORDER. Z- GUARD 
PASTE APPLIED TO GROIN AND SKIN FOLDS. CONTINUE TO OBSERVE.

## 2022-08-13 NOTE — NUR
RECEIVED REPORT FROM NIGHT SHIFT NURSE FOR CONTINUITY OF CARE. PT IS AWAKE. RESPIRATIONS 
EVEN AND UNLABORED ON ROOM AIR. NO DISTRESS NOTED. PT ON TELE MONITOR. IV SITE AT RIGHT 
WRIST 20G INFUSING NS AT 75 ML/ HR. CALL LIGHT WITHIN REACH. SAFETY PRECAUTIONS IN PLACE. 
WILL CONTINUE TO MONITOR.

## 2022-08-13 NOTE — NUR
STRAIGHT CATHED PT FOR URINE SPECIMEN FOR UA AND DRUG SCREEN. AT 0630 DF=308 4 UNITS HUMALOG 
INSULIN GIVEN PER SLIDING SCALE.

## 2022-08-13 NOTE — NUR
ADMINISTER SCHEDULED MORNING MEDS. K-DUR GIVEN FOR POTASSIUM 3.3. PT TEACHING ABOUT MEDS 
GIVEN. PT VERBALIZED UNDERSTANDING. WILL CONTINUE TO MONITOR.

## 2022-08-13 NOTE — NUR
MEDICATIONS WERE GIVEN. PATIENT TOLERATED WELL. BS ; GAVE 4 UNITS OF HUMALOG FOR 
COVERAGE. PATIENT WAS RESTING IN HIGH FOWLERS POSITION UPON ENTERING ROOM. PATIENT WAS ABLE 
TO GO BACK TO SLEEP AFTER ADMINISTRATION OF MEDICATIONS. BREATHING WAS NORMAL WITH 
SYMMETRICAL RISE AND FALL OF CHEST. BED WAS IN LOWEST POSITION, WHEELS LOCKED, CALL LIGHT IN 
REACH. WILL CONTINUE TO OBSERVE PATIENT.

## 2022-08-13 NOTE — NUR
RECEIVED REPORT FROM DAY SHIFT NURSE FOR CONTINUITY OF CARE. PATIENT IS A&O X1 AND IS HARD 
OF HEARING. PATIENT IS ON ROOM AIR, BREATHING IS NORMAL WITH SYMMETRICAL RISE AND FALL OF 
CHEST. IV IS A 20G IN RIGHT WRIST RUNNING NS AT 75ML/HR. PATIENT IS SITTING IN HIGH FOWLERS 
POSITION. EYES ARE OPEN, BUT SHE IS NOT ALERT OR ORIENTED TO WHAT'S GOING ON. BED IS IN 
LOWEST POSITION, WHEELS LOCKED, CALL LIGHT IS IN PLACE. WILL CONTINUE TO OBSERVE PATIENT.

## 2022-08-14 VITALS — SYSTOLIC BLOOD PRESSURE: 161 MMHG | DIASTOLIC BLOOD PRESSURE: 90 MMHG

## 2022-08-14 VITALS — SYSTOLIC BLOOD PRESSURE: 148 MMHG | DIASTOLIC BLOOD PRESSURE: 87 MMHG

## 2022-08-14 VITALS — DIASTOLIC BLOOD PRESSURE: 86 MMHG | SYSTOLIC BLOOD PRESSURE: 166 MMHG

## 2022-08-14 LAB
ANION GAP SERPL CALCULATED.3IONS-SCNC: 13.3 MMOL/L (ref 8–16)
BASOPHILS # BLD AUTO: 0.1 K/UL (ref 0–0.22)
BASOPHILS NFR BLD AUTO: 1 % (ref 0–2)
BUN SERPL-MCNC: 10 MG/DL (ref 7–18)
CHLORIDE SERPL-SCNC: 109 MMOL/L (ref 98–107)
CO2 SERPL-SCNC: 21.7 MMOL/L (ref 21–32)
CREAT SERPL-MCNC: 1.1 MG/DL (ref 0.6–1.3)
EOSINOPHIL # BLD AUTO: 0.1 K/UL (ref 0–0.4)
EOSINOPHIL NFR BLD AUTO: 1.4 % (ref 0–4)
ERYTHROCYTE [DISTWIDTH] IN BLOOD BY AUTOMATED COUNT: 14.9 % (ref 11.6–13.7)
GFR SERPL CREATININE-BSD FRML MDRD: (no result) ML/MIN (ref 90–?)
GLUCOSE SERPL-MCNC: 170 MG/DL (ref 74–106)
HCT VFR BLD AUTO: 32.3 % (ref 36–48)
HGB BLD-MCNC: 10.8 G/DL (ref 12–16)
LYMPHOCYTES # BLD AUTO: 1.9 K/UL (ref 2.5–16.5)
LYMPHOCYTES NFR BLD AUTO: 30.6 % (ref 20.5–51.1)
MCH RBC QN AUTO: 28 PG (ref 27–31)
MCHC RBC AUTO-ENTMCNC: 33 G/DL (ref 33–37)
MCV RBC AUTO: 85.1 FL (ref 80–94)
MONOCYTES # BLD AUTO: 0.5 K/UL (ref 0.8–1)
MONOCYTES NFR BLD AUTO: 8.7 % (ref 1.7–9.3)
NEUTROPHILS # BLD AUTO: 3.6 K/UL (ref 1.8–7.7)
NEUTROPHILS NFR BLD AUTO: 58.3 % (ref 42.2–75.2)
PLATELET # BLD AUTO: 250 K/UL (ref 140–450)
POTASSIUM SERPL-SCNC: 4 MMOL/L (ref 3.5–5.1)
RBC # BLD AUTO: 3.79 MIL/UL (ref 4.2–5.4)
SODIUM SERPL-SCNC: 140 MMOL/L (ref 136–145)
WBC # BLD AUTO: 6.1 K/UL (ref 4.8–10.8)

## 2022-08-14 RX ADMIN — Medication SCH EA: at 09:02

## 2022-08-14 RX ADMIN — Medication SCH DEV: at 12:14

## 2022-08-14 RX ADMIN — INSULIN LISPRO PRN UNITS: 100 INJECTION, SOLUTION INTRAVENOUS; SUBCUTANEOUS at 07:40

## 2022-08-14 RX ADMIN — Medication SCH GEL: at 12:19

## 2022-08-14 RX ADMIN — MUPIROCIN SCH GM: 2 CREAM TOPICAL at 15:19

## 2022-08-14 RX ADMIN — Medication SCH DEV: at 07:18

## 2022-08-14 RX ADMIN — CHLORHEXIDINE GLUCONATE SCH EA: 2 SOLUTION TOPICAL at 15:19

## 2022-08-14 RX ADMIN — WHITE PETROLATUM SCH EA: 57 PASTE TOPICAL at 12:19

## 2022-08-14 RX ADMIN — LEVOFLOXACIN SCH MLS/HR: 5 INJECTION, SOLUTION INTRAVENOUS at 21:19

## 2022-08-14 RX ADMIN — INSULIN GLARGINE SCH UNITS: 100 INJECTION, SOLUTION SUBCUTANEOUS at 08:58

## 2022-08-14 RX ADMIN — Medication SCH DEV: at 16:30

## 2022-08-14 RX ADMIN — INSULIN LISPRO PRN UNITS: 100 INJECTION, SOLUTION INTRAVENOUS; SUBCUTANEOUS at 12:15

## 2022-08-14 RX ADMIN — Medication SCH DEV: at 21:36

## 2022-08-14 RX ADMIN — WHITE PETROLATUM SCH EA: 57 PASTE TOPICAL at 01:00

## 2022-08-14 NOTE — NUR
ENDORSED BEDSIDE REPORT TO BK DELVALLE RN FOR CONTINUITY OF CARE. PT IN NO ACUTE DISTRESS. 
ALL QUESTIONS ANSWERED.

## 2022-08-14 NOTE — NUR
RECEIVED BEDSIDE REPORT FROM NIGHT SHIFT NURSE, FOR CONTINUOUS OF CARE. PT RESTING, NO 
DISTRESS NOTED, AWAKE ALERT, ABLE TO LET NEEDS KNOWN, IV TO RIGHT HAND 20G PATENT INTACT, 
INFUSING WELL. PT ON ROOM AIR, NO SOB NOTED, INITIAL ASSESSMENT DONE, ALL SAFETY PRECAUTION 
MET, CALL LIGHT WITHIN REACH, WILL CONTINUE TO MONITOR.

## 2022-08-14 NOTE — NUR
PT REFUSED ACCUCHECK. EDUCATED PT ON HYPO/HYPERGLYCEMIA AND RISK OF NOT CHECKING BS. PT SAID 
"NO! IM TIRED OF YOU GUYS POKING ME" AND WOULD NOT LET ME TOUCH HER HANDS. SHE PULLED HER 
HANDS AWAY 3X.

## 2022-08-14 NOTE — NUR
LOOKED IN ON PATIENT. PATIENT WAS SLEEPING IN HIGH FOWLERS POSITION. IV WAS RUNNING NS AT 
75ML. PATIENT'S BREATHING WAS NORMAL WITH SYMMETRICAL RISE AND FALL OF CHEST. WILL CONTINUE 
TO OBSERVE PATIENT.

## 2022-08-14 NOTE — NUR
RECEIVED REPORT FROM DAY SHIFT NURSE FOR CONTINUITY OF CARE. PATIENT IS A&O X1 AND IS HARD 
OF HEARING. PATIENT IS SITTING UP IN SEMI FOWLERS POSITION. PATIENT IS ON ROOM AIR, 
BREATHING NORMALLY WITH SYMMETRICAL RISE AND FALL OF CHEST. IV IS 20G ON RIGHT WRIST, 
RUNNING NS AT 5ML TKO. PATIENT IS ON PURE-WICK. BED IS IN LOWEST POSITION, WHEELS LOCKED, 
CALL LIGHT IN PLACE. WILL CONTINUE TO OBSERVE.

## 2022-08-14 NOTE — NUR
PT BP ELEVATED, 166/86, MEDICATION PRN HYDRALAZINE GIVEN, PT TOLERATED WELL, WILL CONTINUE 
TO MONITOR.

## 2022-08-14 NOTE — NUR
LOOKED IN ON PATIENT. PATIENT IS AWAKE BUT LYING IN SEMI FOWLERS POSITION. IVF RUNNING NS AT 
5ML TKO. SUCTION IS STILL RUNNING AND FUNCTIONING PROPERLY WITH PURE-WICK ATTACHED. 
BREATHING IS NORMAL WITH SYMMETRICAL RISE AND FALL OF CHEST. WILL CONTINUE TO OBSERVE 
PATIENT.

## 2022-08-14 NOTE — NUR
LOOKED IN ON PATIENT. PATIENT WAS SLEEPING IN HIGH FOWLERS POSITION. IVF WAS RUNNING NS AT 
75ML. BREATHING WAS NORMAL WITH SYMMETRICAL RISE AND FALL OF CHEST. BED WAS IN LOWEST 
POSITION, WHEELS LOCKED, CALL LIGHT IN PLACE. WILL CONTINUE TO OBSERVE PATIENT.

## 2022-08-14 NOTE — NUR
GAVE 2100 MEDICATIONS. PATIENT TOLERATED WELL. BS , NO COVERAGE WAS NEEDED. SUCTION 
CANISTER WAS EMPTIED, DUMPED 900ML OF URINE. PATIENT IS AWAKE AND BEING CLEANED BY MIRYAM SEVILLA. BED IS IN LOWEST POSITION, WHEELS LOCKED, CALL LIGHT IN PLACE. WILL CONTINUE TO 
OBSERVE.

## 2022-08-15 VITALS — DIASTOLIC BLOOD PRESSURE: 60 MMHG | SYSTOLIC BLOOD PRESSURE: 140 MMHG

## 2022-08-15 VITALS — SYSTOLIC BLOOD PRESSURE: 155 MMHG | DIASTOLIC BLOOD PRESSURE: 84 MMHG

## 2022-08-15 VITALS — DIASTOLIC BLOOD PRESSURE: 83 MMHG | SYSTOLIC BLOOD PRESSURE: 168 MMHG

## 2022-08-15 LAB
ANION GAP SERPL CALCULATED.3IONS-SCNC: 16 MMOL/L (ref 8–16)
BASOPHILS # BLD AUTO: 0.1 K/UL (ref 0–0.22)
BASOPHILS NFR BLD AUTO: 0.9 % (ref 0–2)
BUN SERPL-MCNC: 7 MG/DL (ref 7–18)
CHLORIDE SERPL-SCNC: 107 MMOL/L (ref 98–107)
CO2 SERPL-SCNC: 21.6 MMOL/L (ref 21–32)
CREAT SERPL-MCNC: 0.9 MG/DL (ref 0.6–1.3)
EOSINOPHIL # BLD AUTO: 0.1 K/UL (ref 0–0.4)
EOSINOPHIL NFR BLD AUTO: 1.7 % (ref 0–4)
ERYTHROCYTE [DISTWIDTH] IN BLOOD BY AUTOMATED COUNT: 14.9 % (ref 11.6–13.7)
GFR SERPL CREATININE-BSD FRML MDRD: (no result) ML/MIN (ref 90–?)
GLUCOSE SERPL-MCNC: 175 MG/DL (ref 74–106)
HCT VFR BLD AUTO: 34 % (ref 36–48)
HGB BLD-MCNC: 11.5 G/DL (ref 12–16)
LYMPHOCYTES # BLD AUTO: 1.4 K/UL (ref 2.5–16.5)
LYMPHOCYTES NFR BLD AUTO: 23.3 % (ref 20.5–51.1)
MCH RBC QN AUTO: 29 PG (ref 27–31)
MCHC RBC AUTO-ENTMCNC: 34 G/DL (ref 33–37)
MCV RBC AUTO: 84.6 FL (ref 80–94)
MONOCYTES # BLD AUTO: 0.5 K/UL (ref 0.8–1)
MONOCYTES NFR BLD AUTO: 7.6 % (ref 1.7–9.3)
NEUTROPHILS # BLD AUTO: 4.1 K/UL (ref 1.8–7.7)
NEUTROPHILS NFR BLD AUTO: 66.5 % (ref 42.2–75.2)
PLATELET # BLD AUTO: 251 K/UL (ref 140–450)
POTASSIUM SERPL-SCNC: 3.6 MMOL/L (ref 3.5–5.1)
RBC # BLD AUTO: 4.02 MIL/UL (ref 4.2–5.4)
SODIUM SERPL-SCNC: 141 MMOL/L (ref 136–145)
WBC # BLD AUTO: 6.1 K/UL (ref 4.8–10.8)

## 2022-08-15 RX ADMIN — Medication SCH EA: at 08:38

## 2022-08-15 RX ADMIN — INSULIN GLARGINE SCH UNITS: 100 INJECTION, SOLUTION SUBCUTANEOUS at 08:37

## 2022-08-15 RX ADMIN — INSULIN LISPRO PRN UNITS: 100 INJECTION, SOLUTION INTRAVENOUS; SUBCUTANEOUS at 20:40

## 2022-08-15 RX ADMIN — INSULIN LISPRO PRN UNITS: 100 INJECTION, SOLUTION INTRAVENOUS; SUBCUTANEOUS at 16:32

## 2022-08-15 RX ADMIN — INSULIN LISPRO PRN UNITS: 100 INJECTION, SOLUTION INTRAVENOUS; SUBCUTANEOUS at 08:03

## 2022-08-15 RX ADMIN — Medication SCH DEV: at 07:20

## 2022-08-15 RX ADMIN — WHITE PETROLATUM SCH EA: 57 PASTE TOPICAL at 01:57

## 2022-08-15 RX ADMIN — CHLORHEXIDINE GLUCONATE SCH EA: 2 SOLUTION TOPICAL at 15:27

## 2022-08-15 RX ADMIN — WHITE PETROLATUM SCH EA: 57 PASTE TOPICAL at 13:21

## 2022-08-15 RX ADMIN — Medication SCH DEV: at 16:29

## 2022-08-15 RX ADMIN — LEVOFLOXACIN SCH MLS/HR: 5 INJECTION, SOLUTION INTRAVENOUS at 20:29

## 2022-08-15 RX ADMIN — MUPIROCIN SCH GM: 2 CREAM TOPICAL at 15:27

## 2022-08-15 RX ADMIN — Medication SCH GEL: at 12:15

## 2022-08-15 RX ADMIN — Medication SCH DEV: at 12:13

## 2022-08-15 RX ADMIN — INSULIN LISPRO PRN UNITS: 100 INJECTION, SOLUTION INTRAVENOUS; SUBCUTANEOUS at 12:19

## 2022-08-15 RX ADMIN — Medication SCH DEV: at 20:29

## 2022-08-15 NOTE — NUR
PATIENT ALERT WITH CONFUSION NO DISTRESS NOTED. NO ADVERSE REACTION NOTED ON IV ANTIBIOTIC 
FOR UTI. ENCOURAGED AND OFFERED MORE FLUID AS TOLERATED. GOOD SKIN CARE. PROVIDED. CALL 
LIGHT WITH IN EASY REACH.

## 2022-08-15 NOTE — NUR
ALL DUE MEDS GIVEN AS ORDERED. NO ADVERSE DRUG REACTION NOTED AND NO COMPLAIN FROM THE PT. 
WILL CONTINUE OBSERVATION.

## 2022-08-15 NOTE — NUR
RECEIVED ENDORSEMENT FROM NIGHT SHIFT NURSE FOR CONTINUITY OF CARE. PATIENT ASLEEP NO 
DISTRESS NOTED. RESPIRATION EVEN AND NOT LABORED NO SHORTNESS OF BREATH ON ROOM AIR. IV SITE 
ON RIGHT HAND ROSALIE 20 TKO. ALL SAFETY MEASURE IN PLACE.

## 2022-08-15 NOTE — NUR
NOTED PATIENT WITH DISCHARGE ORDER CALLED [facility]  SPOKE TO SUDHEER THAT PATIENT CAN'T 
COME BACK THERE AND NEED TO CONTACT FROM  FROM [facility]  CALLED 2X 
NO ANSWER WILL TRY AGAIN LATER.

## 2022-08-15 NOTE — NUR
DISCHARGE PLANNING 

JAMEY ATTEMPTED TO CONTACT [facility] STAFF/MANAGER MARY AT [phone] TO DISCUSS 
POSSIBLE COMPLICATIONS WITH DISCHARGE AND TO PLAN AN SAFE DISCHARGE FOR PATIENT.  MARY 
WAS NOT AVAILABLE AND JAMEY  LEFT HER A VOICE MAIL MS WITH DIRECT CONTACT INFORMATION AND A 
REQUEST FOR A CALL BACK [facility].  

-------------------------------------------------------------------------------

Addendum: 08/16/22 at 1306 by Maritza SALINAS

-------------------------------------------------------------------------------

LATE ENTRY CALL WAS ABOUT 10:30AM 

DISCHARGE PLANNING 

SW CONTACT [facility] STAFF/MANAGER MARY AT [phone] TO DISCUSS PATIENT'S 
STATUS.  SPOKE TO SUDHEER (FACILITY STAFF) WHO STATED THAT [facility] WAS NOT IN TODAY AND WILL 
NOT BE IN UNTIL TOMORROW.  JAMEY ASKED SUDHEER THE REASONS WHY [facility] WILL NOT 
ACCEPT PATIENT BACK.  PER SUDHEER THE  [facility] RECEIVED A CALL FROM [facility] 
 NAM [phone] WHO STATED THAT PATIENT SEEMS TO BE IN NEED OF HIGHER 
LEVEL OF CARE THAT [facility] IS UNABLE TO PROVIDE. THEREFORE; SHE WILL BE REFERREED 
AND SEND TO A SNF FOR LONG TERM CARE.  JAMEY THANKED HER FOR THE INFORMATION AND ENDED THE 
CALL. 



LATE ENTRY CALL WAS ABOUT 11:00AM 

VIOLA BROWNING CALL [facility] ABOUT PATIENT STATUS AND DISCUSS HER REFERRAL AND SEARCH FOR A 
SNF.  PER JAMEY BROWNING SHE HAS INITIATED THE SEARCH FOR SNF AND SEND A REFERRAL TO [facility] AND AT THIS POINT IS WAITING FOR AN ANSWER, JAMEY AGREE AND ASKED JAMEY BROWNING IF SHE 
WANTED FOR JAMEY TO SEND ANOTHER REFERRAL TO [facility] SNF IN THE AREA OF 
[place].  [facility] CONCEPCION AGREED AND STATED THAT IF PATIENT GETS ACCEPTED TO [facility] SNF TO 
CONTACT HER TO LET HER KNOW AND SET UP ALL INFORMATION WITH INS. TO GET PATIENT TRANSFER TO 
FACILITY AS SOON AS POSSIBLE. [facility]/JAMEY AGREED AND WILL CONTINUE CONTACT AS NEEDED.  

 

JAMEY FAXED PATIENT'S INFORMATION AND CLINICALS TO [facility] AT [phone] WITH COMPLETED 
CONFIRMATION AT ABOUT 11:19AM 






-------------------------------------------------------------------------------

Addendum: 08/16/22 at 2521 by Maritza Kumar 

-------------------------------------------------------------------------------

YARITZA FROM [facility] [phone] CALL JAMEY STATING THAT THEY RECEIVED 
PATIENT'S  REFERRAL AND CLINICAL PACKET AND AFTER REVIEWING PATIENT'S INFORMATION; PATIENT 
IS BEEN ACCEPTED TO THEIR FACILITY.  JAMEY INFORMED YARITZA THAT [facility] LARES AND SW WORKING ON 
PLACEMENT FOR PATIENT HAS TO BE CONTACTED TO INFORM HER OF PATIENT'S ACCEPTANCE. YARITZA 
AGREE. JAMEY PROVIDED HER WITH HER NUMBER AND ALSO LET HER KNOW THAT SW WILL BE CALL BY [facility] TO 
GIVE HER THE INFORMATION AND [facility]/YARITZA CONTACT NUMBER TO DISCUSS PLACEMENT ACCEPTANCE. YARITZA 
AGREED AND ENDED THE CALL. 



JAMEY CALL [facility] BROWNING AT [phone] AND INFORM HER THAT PATIENT HAS BEEN ACCEPTED TO 
[facility] FACILITY; WHICH IS A CONTRACTED FACILITY WITH [facility]. VIOLA BROWNING AGREED 
REQUESTED [facility] CONTACT NUMBER FOR YARITZA AND STATED THAT SHE WILL PLACE A CALL TO HER AND SET 
UP PATIENT PLACEMENT AND TRANSPORT TO FACILITY.   JAMEY AGREED AND ENDED THE CALL.  CM/SW WILL 
FOLLOW UP AS NEEDED.

## 2022-08-15 NOTE — NUR
LOOKED IN ON PATIENT. PATIENT WAS SLEEPING. IVF WAS RUNNING NS 5ML TKO. PATIENT IS BREATHING 
NORMALLY WITH SYMMETRICAL RISE AND FALL OF CHEST. BED IS IN LOWEST POSITION, WHEELS LOCKED 
CALL LIGHT IN REACH. WILL CONTINUE TO OBSERVE PATIENT.

## 2022-08-15 NOTE — NUR
LOOKED IN ON PATIENT. PATIENT WAS SLEEPING. BREATHING WAS NORMAL WITH SYMMETRICAL RISE AND 
FALL OF CHEST. WILL CONTINUE TO OBSERVE.

## 2022-08-16 VITALS — DIASTOLIC BLOOD PRESSURE: 84 MMHG | SYSTOLIC BLOOD PRESSURE: 155 MMHG

## 2022-08-16 VITALS — SYSTOLIC BLOOD PRESSURE: 159 MMHG | DIASTOLIC BLOOD PRESSURE: 78 MMHG

## 2022-08-16 VITALS — DIASTOLIC BLOOD PRESSURE: 72 MMHG | SYSTOLIC BLOOD PRESSURE: 142 MMHG

## 2022-08-16 RX ADMIN — INSULIN LISPRO PRN UNITS: 100 INJECTION, SOLUTION INTRAVENOUS; SUBCUTANEOUS at 11:38

## 2022-08-16 RX ADMIN — INSULIN LISPRO PRN UNITS: 100 INJECTION, SOLUTION INTRAVENOUS; SUBCUTANEOUS at 16:48

## 2022-08-16 RX ADMIN — WHITE PETROLATUM SCH EA: 57 PASTE TOPICAL at 13:00

## 2022-08-16 RX ADMIN — WHITE PETROLATUM SCH EA: 57 PASTE TOPICAL at 00:48

## 2022-08-16 RX ADMIN — INSULIN GLARGINE SCH UNITS: 100 INJECTION, SOLUTION SUBCUTANEOUS at 08:41

## 2022-08-16 RX ADMIN — Medication SCH DEV: at 06:27

## 2022-08-16 RX ADMIN — Medication SCH DEV: at 21:45

## 2022-08-16 RX ADMIN — INSULIN LISPRO PRN UNITS: 100 INJECTION, SOLUTION INTRAVENOUS; SUBCUTANEOUS at 06:29

## 2022-08-16 RX ADMIN — Medication SCH DEV: at 11:39

## 2022-08-16 RX ADMIN — MUPIROCIN SCH GM: 2 CREAM TOPICAL at 15:25

## 2022-08-16 RX ADMIN — CHLORHEXIDINE GLUCONATE SCH EA: 2 SOLUTION TOPICAL at 15:25

## 2022-08-16 RX ADMIN — Medication SCH DEV: at 16:48

## 2022-08-16 RX ADMIN — Medication SCH EA: at 08:43

## 2022-08-16 RX ADMIN — Medication SCH GEL: at 13:00

## 2022-08-16 RX ADMIN — INSULIN LISPRO PRN UNITS: 100 INJECTION, SOLUTION INTRAVENOUS; SUBCUTANEOUS at 21:46

## 2022-08-16 NOTE — NUR
RECD. RESTING IN BED, AWAKE, A/OX1. RESPIRATION EVEN AND UNLABORED. IV SALINE LOCK AT THE 
LEFT FOREARM G20, PATENT AND INTACT. BEDBOUND AND INCONTINENT. REORIENTED TO HOSPITAL 
SETTING. SAFETY MEASURES ENFORCED. BED IN THE LOWEST POSITION. BED ON ALARM. CALL LIGHT IN 
REACH. DENIES PAIN 0/10.

## 2022-08-16 NOTE — NUR
PATIENT VITALS SIGNS STABLE, AFEBRILE, SATING 97% ON RA. NO COMPLAIN OF PAIN AT THIS TIME. 
CALL LIGHT WITHIN REACH. WILL CONTINUE OBSERVATION.

## 2022-08-16 NOTE — NUR
NO ACUTE EVENT THROUGHOUT THE NIGHT. PT STABLE AND NOT IN ANY DISTRESS. NO COMPLAIN AT THIS 
TIME. ALL NEEDS ATTENDED. WILL ENDORSE THE PATIENT TO THE ONCOMING NURSE FOR CONTINUITY OF 
CARE.

## 2022-08-16 NOTE — NUR
***** PHYSICAL THERAPY CO-SIGN *****

The Physical Therapy Progress Notes documented by Physical Therapy Assistant have been 
reviewed.



Reviewed/Co-Signed by: Judith Alva

Documentation Done by: PAUL OSHEA PTA

-------------------------------------------------------------------------------

Addendum: 08/16/22 at 1159 by Judith Alva PT

-------------------------------------------------------------------------------

Amended: Links added.

## 2022-08-16 NOTE — NUR
PATIENT ASLEEP AT THIS TIME. VISIBLE CHEST RISE AND FALL NOTED. PT NOT IN ANY DISTRESS. WILL 
CONTINUE OBSERVATION.

## 2022-08-17 VITALS — DIASTOLIC BLOOD PRESSURE: 72 MMHG | SYSTOLIC BLOOD PRESSURE: 149 MMHG

## 2022-08-17 VITALS — SYSTOLIC BLOOD PRESSURE: 135 MMHG | DIASTOLIC BLOOD PRESSURE: 75 MMHG

## 2022-08-17 VITALS — SYSTOLIC BLOOD PRESSURE: 149 MMHG | DIASTOLIC BLOOD PRESSURE: 72 MMHG

## 2022-08-17 RX ADMIN — INSULIN LISPRO PRN UNITS: 100 INJECTION, SOLUTION INTRAVENOUS; SUBCUTANEOUS at 06:34

## 2022-08-17 RX ADMIN — Medication SCH DEV: at 12:15

## 2022-08-17 RX ADMIN — Medication SCH DEV: at 06:32

## 2022-08-17 RX ADMIN — WHITE PETROLATUM SCH EA: 57 PASTE TOPICAL at 13:40

## 2022-08-17 RX ADMIN — INSULIN GLARGINE SCH UNITS: 100 INJECTION, SOLUTION SUBCUTANEOUS at 08:33

## 2022-08-17 RX ADMIN — Medication SCH EA: at 08:33

## 2022-08-17 RX ADMIN — WHITE PETROLATUM SCH EA: 57 PASTE TOPICAL at 01:29

## 2022-08-17 RX ADMIN — Medication SCH GEL: at 13:40

## 2022-08-17 RX ADMIN — INSULIN LISPRO PRN UNITS: 100 INJECTION, SOLUTION INTRAVENOUS; SUBCUTANEOUS at 12:17

## 2022-08-17 NOTE — NUR
RECEIVED REPORT FROM NIGHT SHIFT NURSE FOR CONTINUITY OF CARE. PT IS SLEEPING, EASILY 
AROUSABLE BY VERBAL STIMULI. RESPIRATIONS EVEN AND UNLABORED ON RA. NO DISTRESS NOTED. IV 
SITE ON CLARITA MICHELE. PT ON PUREWICK. CALL LIGHT WITHIN REACH. SAFETY PRECAUTIONS IN PLACE. WILL 
CONTINUE TO MONITOR.

## 2022-08-17 NOTE — NUR
ASSISTED CNA IN CLEANING AND CHANGING PT DIAPER. PT HAD BM. IMAGE TAKEN ON SACRAL AREA AND 
FILED AT PT CHART.

## 2022-08-17 NOTE — NUR
DISCHARGE PAPER DISCUSSED WITH THE PT. PT UNABLE TO SIGN. CALLED MELIS[facility] GARCIA TO DISCUSS 
DC PAPERS BUT UNABLE TO PICK-UP PHONE. LEFT MESSAGE.

## 2022-08-17 NOTE — NUR
ADMINSITERED SCHEDULED MORNING MEDS. PT TEACHING ABOUT MEDS GIVEN. PT VERBALIZED 
UNDERSTANDING. PT SEEN AND CHECKED BY DR KENDALL.

## 2022-08-17 NOTE — NUR
PT DC TO [facility]. TRANSPORT STAFF WHEELED PT OUT VIA GURNEY. REMOVED IV CATHETER INTACT. REMOVED 
ID WRIST BAND. ALL BELONGINGS TAKEN UPON DC. PT IS STABLE.

## 2024-10-26 ENCOUNTER — HOSPITAL ENCOUNTER (INPATIENT)
Dept: HOSPITAL 26 - MED | Age: 84
LOS: 1 days | DRG: 871 | End: 2024-10-27
Payer: COMMERCIAL

## 2024-10-26 VITALS — OXYGEN SATURATION: 92 %

## 2024-10-26 VITALS — HEIGHT: 67 IN | WEIGHT: 180 LBS | BODY MASS INDEX: 28.25 KG/M2

## 2024-10-26 DIAGNOSIS — Z88.8: ICD-10-CM

## 2024-10-26 DIAGNOSIS — Z79.4: ICD-10-CM

## 2024-10-26 DIAGNOSIS — E11.9: ICD-10-CM

## 2024-10-26 DIAGNOSIS — Z66: ICD-10-CM

## 2024-10-26 DIAGNOSIS — I10: ICD-10-CM

## 2024-10-26 DIAGNOSIS — J69.0: ICD-10-CM

## 2024-10-26 DIAGNOSIS — J96.01: ICD-10-CM

## 2024-10-26 DIAGNOSIS — Z79.899: ICD-10-CM

## 2024-10-26 DIAGNOSIS — N39.0: ICD-10-CM

## 2024-10-26 DIAGNOSIS — Z20.822: ICD-10-CM

## 2024-10-26 DIAGNOSIS — A41.9: Primary | ICD-10-CM

## 2024-10-26 DIAGNOSIS — F32.A: ICD-10-CM

## 2024-10-26 DIAGNOSIS — Z88.5: ICD-10-CM

## 2024-10-26 DIAGNOSIS — N17.9: ICD-10-CM

## 2024-10-26 LAB
ALBUMIN FLD-MCNC: 3.1 G/DL (ref 3.4–5)
ALP SERPL-CCNC: 118 U/L (ref 50–136)
ALT SERPL-CCNC: 53 U/L (ref 12–78)
ANION GAP SERPL CALCULATED.3IONS-SCNC: 16.3 MMOL/L (ref 8–16)
APTT PPP: 22.5 SECS (ref 22–35.6)
AST SERPL-CCNC: 275 U/L (ref 15–37)
BASE EXCESS BLDV CALC-SCNC: -9.1 MMOL/L (ref -2–3)
BASOPHILS # BLD AUTO: 0.1 K/UL (ref 0–0.22)
BASOPHILS NFR BLD AUTO: 0.5 % (ref 0–2)
BILIRUB DIRECT SERPL-MCNC: 0.1 MG/DL (ref 0–0.3)
BILIRUB SERPL-MCNC: 0.3 MG/DL (ref 0–1)
BUN SERPL-MCNC: 35 MG/DL (ref 7–18)
CALCIUM SPEC-MCNC: 9.5 MG/DL (ref 8.5–10.1)
CHLORIDE SERPL-SCNC: 100 MMOL/L (ref 98–107)
CO2 SERPL-SCNC: 24.8 MMOL/L (ref 21–32)
CREAT SERPL-MCNC: 1.4 MG/DL (ref 0.6–1.3)
EOSINOPHIL # BLD AUTO: 0 K/UL (ref 0–0.4)
EOSINOPHIL NFR BLD AUTO: 0.1 % (ref 0–4)
ERYTHROCYTE [DISTWIDTH] IN BLOOD BY AUTOMATED COUNT: 15.5 % (ref 11.6–13.7)
GFR SERPL CREATININE-BSD FRML MDRD: (no result) ML/MIN (ref 90–?)
GLUCOSE SERPL-MCNC: 395 MG/DL (ref 74–106)
HCO3 BLDV-SCNC: 17.2 MMOL/L (ref 21–28)
HCT VFR BLD AUTO: 43.6 % (ref 36–48)
HGB BLD-MCNC: 14 G/DL (ref 12–16)
INHALED O2 CONCENTRATION: 100 % (ref 0.21–100)
INR PPP: 0.95 (ref 0.8–1.2)
LACTATE PLASV-SCNC: 5.1 MMOL/L (ref 0.4–2)
LYMPHOCYTES # BLD AUTO: 1.6 K/UL (ref 2.5–16.5)
LYMPHOCYTES NFR BLD AUTO: 12.7 % (ref 20.5–51.1)
MCH RBC QN AUTO: 29 PG (ref 27–31)
MCHC RBC AUTO-ENTMCNC: 32 G/DL (ref 33–37)
MCV RBC AUTO: 89.6 FL (ref 80–94)
MONOCYTES # BLD AUTO: 0.3 K/UL (ref 0.8–1)
MONOCYTES NFR BLD AUTO: 2.1 % (ref 1.7–9.3)
NEUTROPHILS # BLD AUTO: 10.9 K/UL (ref 1.8–7.7)
NEUTROPHILS NFR BLD AUTO: 84.6 % (ref 42.2–75.2)
PCO2 BLD: 38.3 MMHG (ref 32–45)
PH BLD: 7.27 [PH] (ref 7.35–7.45)
PLATELET # BLD AUTO: 360 K/UL (ref 140–450)
PO2 BLD: 77.8 MMHG (ref 83–108)
POTASSIUM SERPL-SCNC: 5.1 MMOL/L (ref 3.5–5.1)
PROT SERPL-MCNC: 8.2 G/DL (ref 6.4–8.2)
PROTHROMBIN TIME: 10 SECS (ref 10.8–13.4)
RBC # BLD AUTO: 4.86 MIL/UL (ref 4.2–5.4)
SAO2 DF BLD: 92.9 % (ref 94–98)
SODIUM SERPL-SCNC: 136 MMOL/L (ref 136–145)
WBC # BLD AUTO: 12.9 K/UL (ref 4.8–10.8)

## 2024-10-26 RX ADMIN — ALBUTEROL SULFATE ONE MG: 2.5 SOLUTION RESPIRATORY (INHALATION) at 23:36

## 2024-10-26 RX ADMIN — SODIUM CHLORIDE ONE MLS/HR: 9 INJECTION, SOLUTION INTRAVENOUS at 23:10

## 2024-10-26 RX ADMIN — SODIUM CHLORIDE ONE MLS/HR: 9 INJECTION, SOLUTION INTRAVENOUS at 23:13

## 2024-10-27 VITALS
SYSTOLIC BLOOD PRESSURE: 117 MMHG | HEART RATE: 131 BPM | RESPIRATION RATE: 43 BRPM | DIASTOLIC BLOOD PRESSURE: 80 MMHG | OXYGEN SATURATION: 99 %

## 2024-10-27 VITALS — OXYGEN SATURATION: 89 % | RESPIRATION RATE: 37 BRPM | HEART RATE: 78 BPM

## 2024-10-27 VITALS
TEMPERATURE: 97.8 F | RESPIRATION RATE: 32 BRPM | SYSTOLIC BLOOD PRESSURE: 171 MMHG | DIASTOLIC BLOOD PRESSURE: 106 MMHG | OXYGEN SATURATION: 93 % | HEART RATE: 133 BPM

## 2024-10-27 VITALS
OXYGEN SATURATION: 95 % | DIASTOLIC BLOOD PRESSURE: 26 MMHG | HEART RATE: 109 BPM | RESPIRATION RATE: 22 BRPM | SYSTOLIC BLOOD PRESSURE: 43 MMHG | TEMPERATURE: 97.5 F

## 2024-10-27 VITALS — OXYGEN SATURATION: 99 %

## 2024-10-27 VITALS
HEART RATE: 88 BPM | DIASTOLIC BLOOD PRESSURE: 94 MMHG | OXYGEN SATURATION: 97 % | SYSTOLIC BLOOD PRESSURE: 56 MMHG | RESPIRATION RATE: 30 BRPM

## 2024-10-27 VITALS — HEART RATE: 65 BPM | OXYGEN SATURATION: 97 %

## 2024-10-27 LAB
ALBUMIN FLD-MCNC: 2.7 G/DL (ref 3.4–5)
ALP SERPL-CCNC: 103 U/L (ref 50–136)
ALT SERPL-CCNC: 117 U/L (ref 12–78)
ANION GAP SERPL CALCULATED.3IONS-SCNC: 21.9 MMOL/L (ref 8–16)
APPEARANCE UR: (no result)
AST SERPL-CCNC: 493 U/L (ref 15–37)
BACTERIA URNS QL MICRO: (no result) /HPF
BASOPHILS # BLD AUTO: 0 K/UL (ref 0–0.22)
BILIRUB SERPL-MCNC: 0.4 MG/DL (ref 0–1)
BILIRUB UR QL STRIP: NEGATIVE
BUN SERPL-MCNC: 43 MG/DL (ref 7–18)
CALCIUM SPEC-MCNC: 8.7 MG/DL (ref 8.5–10.1)
CHLORIDE SERPL-SCNC: 101 MMOL/L (ref 98–107)
CO2 SERPL-SCNC: 19.1 MMOL/L (ref 21–32)
COLOR UR: YELLOW
CREAT SERPL-MCNC: 2.1 MG/DL (ref 0.6–1.3)
EOSINOPHIL # BLD AUTO: 0 K/UL (ref 0–0.4)
ERYTHROCYTE [DISTWIDTH] IN BLOOD BY AUTOMATED COUNT: 16 % (ref 11.6–13.7)
GFR SERPL CREATININE-BSD FRML MDRD: (no result) ML/MIN (ref 90–?)
GLUCOSE SERPL-MCNC: 401 MG/DL (ref 74–106)
GLUCOSE UR STRIP-MCNC: NEGATIVE MG/DL
HCT VFR BLD AUTO: 41.7 % (ref 36–48)
HGB BLD-MCNC: 13 G/DL (ref 12–16)
HGB UR QL STRIP: (no result)
LACTATE PLASV-SCNC: 9.3 MMOL/L (ref 0.4–2)
LEUKOCYTE ESTERASE UR QL STRIP: (no result)
LYMPHOCYTES NFR BLD AUTO: 10 % (ref 20.5–51.1)
LYMPHOCYTES NFR BLD MANUAL: 11 % (ref 20–46)
MAGNESIUM SERPL-MCNC: 2.7 MG/DL (ref 1.8–2.4)
MCH RBC QN AUTO: 29 PG (ref 27–31)
MCHC RBC AUTO-ENTMCNC: 31 G/DL (ref 33–37)
MCV RBC AUTO: 92.8 FL (ref 80–94)
MONOCYTES # BLD AUTO: 1 K/UL (ref 0.8–1)
MONOCYTES NFR BLD AUTO: 6 % (ref 1.7–9.3)
MONOCYTES NFR BLD MANUAL: 7 % (ref 5–12)
NEUTROPHILS # BLD AUTO: 15.1 K/UL (ref 1.8–7.7)
NEUTROPHILS NFR BLD AUTO: 82 % (ref 42.2–75.2)
NITRITE UR QL STRIP: NEGATIVE
PH UR STRIP: 6 [PH] (ref 5–9)
PHOSPHATE SERPL-MCNC: 6.4 MG/DL (ref 2.5–4.9)
PLATELET # BLD AUTO: 282 K/UL (ref 140–450)
POTASSIUM SERPL-SCNC: 7 MMOL/L (ref 3.5–5.1)
PROT SERPL-MCNC: 7.2 G/DL (ref 6.4–8.2)
PROT UR QL STRIP: (no result)
RBC # BLD AUTO: 4.49 MIL/UL (ref 4.2–5.4)
RBC #/AREA URNS HPF: (no result) /HPF (ref 0–5)
SODIUM SERPL-SCNC: 135 MMOL/L (ref 136–145)
SP GR UR STRIP: >=1.03 (ref 1–1.03)
SQUAMOUS URNS QL MICRO: (no result) /LPF
UROBILINOGEN UR STRIP-MCNC: 0.2 EU/DL (ref 0.2–1)
WBC # BLD AUTO: 17.3 K/UL (ref 4.8–10.8)
WBC,URINE: (no result) /HPF (ref 0–5)

## 2024-10-27 PROCEDURE — 5A09357 ASSISTANCE WITH RESPIRATORY VENTILATION, LESS THAN 24 CONSECUTIVE HOURS, CONTINUOUS POSITIVE AIRWAY PRESSURE: ICD-10-PCS

## 2024-10-27 RX ADMIN — SODIUM CHLORIDE ONE MLS/HR: 9 INJECTION, SOLUTION INTRAVENOUS at 05:07

## 2024-10-27 RX ADMIN — DEXTROSE ONE MLS/HR: 5 SOLUTION INTRAVENOUS at 00:25

## 2024-10-27 RX ADMIN — IPRATROPIUM BROMIDE AND ALBUTEROL SULFATE SCH ML: .5; 3 SOLUTION RESPIRATORY (INHALATION) at 07:38

## 2024-10-27 RX ADMIN — WATER SCH MG: 1 INJECTION INTRAMUSCULAR; INTRAVENOUS; SUBCUTANEOUS at 05:07
